# Patient Record
Sex: FEMALE | Race: WHITE | NOT HISPANIC OR LATINO | Employment: OTHER | URBAN - METROPOLITAN AREA
[De-identification: names, ages, dates, MRNs, and addresses within clinical notes are randomized per-mention and may not be internally consistent; named-entity substitution may affect disease eponyms.]

---

## 2017-01-09 DIAGNOSIS — E03.8 OTHER SPECIFIED HYPOTHYROIDISM: ICD-10-CM

## 2017-01-09 RX ORDER — LOSARTAN POTASSIUM 50 MG/1
TABLET ORAL
COMMUNITY
Start: 2014-01-15 | End: 2017-01-09 | Stop reason: SDUPTHER

## 2017-01-10 ENCOUNTER — TELEPHONE (OUTPATIENT)
Dept: MEDICAL GROUP | Facility: PHYSICIAN GROUP | Age: 67
End: 2017-01-10

## 2017-01-10 DIAGNOSIS — E78.1 PURE HYPERGLYCERIDEMIA: ICD-10-CM

## 2017-01-10 DIAGNOSIS — E03.8 OTHER SPECIFIED HYPOTHYROIDISM: ICD-10-CM

## 2017-01-10 DIAGNOSIS — E55.9 VITAMIN D DEFICIENCY DISEASE: ICD-10-CM

## 2017-01-10 DIAGNOSIS — I10 ESSENTIAL HYPERTENSION: ICD-10-CM

## 2017-01-10 DIAGNOSIS — D50.8 OTHER IRON DEFICIENCY ANEMIA: ICD-10-CM

## 2017-01-10 DIAGNOSIS — R73.01 IMPAIRED FASTING GLUCOSE: ICD-10-CM

## 2017-01-10 RX ORDER — LOSARTAN POTASSIUM 50 MG/1
50 TABLET ORAL DAILY
Qty: 90 TAB | Refills: 3 | Status: SHIPPED | OUTPATIENT
Start: 2017-01-10 | End: 2017-01-18 | Stop reason: SDUPTHER

## 2017-01-10 RX ORDER — LEVOTHYROXINE SODIUM 88 UG/1
TABLET ORAL
Qty: 90 TAB | Refills: 3 | Status: SHIPPED | OUTPATIENT
Start: 2017-01-10 | End: 2017-01-18 | Stop reason: SDUPTHER

## 2017-01-10 NOTE — TELEPHONE ENCOUNTER
Patient is waiting for referral to go through for internal medicine. The referral is pending, waiting for you to change the codes. Can you refill her meds in the meantime? Please advise.

## 2017-01-10 NOTE — TELEPHONE ENCOUNTER
Ok the referral has been resubmitted.  This is a very highly unusual referral request.  Typically you cannot have a referral to see another PCP from your existing PCP.  I am happy to see if it works though.

## 2017-01-10 NOTE — TELEPHONE ENCOUNTER
Did you receive note from referral department regarding referral? The code is wrong or something on the internal med referral. Please advise.

## 2017-01-18 RX ORDER — LEVOTHYROXINE SODIUM 88 UG/1
TABLET ORAL
Qty: 90 TAB | Refills: 3 | Status: SHIPPED | OUTPATIENT
Start: 2017-01-18 | End: 2017-12-04

## 2017-01-18 RX ORDER — LOSARTAN POTASSIUM 50 MG/1
50 TABLET ORAL DAILY
Qty: 90 TAB | Refills: 3 | Status: SHIPPED | OUTPATIENT
Start: 2017-01-18

## 2017-01-18 RX ORDER — SIMVASTATIN 40 MG
TABLET ORAL
Qty: 30 TAB | Refills: 0 | Status: SHIPPED | OUTPATIENT
Start: 2017-01-18 | End: 2017-02-02 | Stop reason: SDUPTHER

## 2017-01-18 RX ORDER — SIMVASTATIN 40 MG
TABLET ORAL
COMMUNITY
Start: 2014-01-15 | End: 2017-01-18 | Stop reason: SDUPTHER

## 2017-01-30 ENCOUNTER — TELEPHONE (OUTPATIENT)
Dept: MEDICAL GROUP | Facility: PHYSICIAN GROUP | Age: 67
End: 2017-01-30

## 2017-01-30 NOTE — TELEPHONE ENCOUNTER
----- Message from Shannon Sorto sent at 1/29/2017  4:27 PM PST -----  Regarding: FW: Referral Request  Contact: [phone]      ----- Message -----     From: Christen Dennis     Sent: 1/28/2017   3:21 PM       To: Amb All Mas  Subject: Referral Request                                 Christen Dennis would like to request a referral.  Reason: Severe hair loss  Requested provider: Dr. Bynum [address] [place] [address]  Comment:  I received a referral to Elena CAIN at [address] [place] [address] a dermatologist. When I called for an appointment at the clinic they had me see Dr. Bynum as he had openings, they both work at the same facility both are dermatologists. It looks like [facility] is not recognizing that and is charging me a non referral fee. How do I fix this? I have an appointment on Thursday again with the Dermatologist. I still do not have hair but there is some growing with the treatments from the dermatologist.Christen

## 2017-02-02 RX ORDER — SIMVASTATIN 40 MG
TABLET ORAL
Qty: 90 TAB | Refills: 0 | Status: SHIPPED | OUTPATIENT
Start: 2017-02-02

## 2017-02-03 ENCOUNTER — PATIENT MESSAGE (OUTPATIENT)
Dept: MEDICAL GROUP | Facility: PHYSICIAN GROUP | Age: 67
End: 2017-02-03

## 2017-02-03 DIAGNOSIS — L65.9 ALOPECIA: ICD-10-CM

## 2017-02-05 DIAGNOSIS — I10 ESSENTIAL HYPERTENSION: ICD-10-CM

## 2017-02-05 DIAGNOSIS — E03.8 OTHER SPECIFIED HYPOTHYROIDISM: ICD-10-CM

## 2017-02-05 DIAGNOSIS — E78.1 PURE HYPERGLYCERIDEMIA: ICD-10-CM

## 2017-02-06 RX ORDER — METOPROLOL SUCCINATE 25 MG
TABLET, EXTENDED RELEASE 24 HR ORAL
Qty: 90 TAB | Refills: 0 | Status: SHIPPED | OUTPATIENT
Start: 2017-02-06 | End: 2017-05-06 | Stop reason: SDUPTHER

## 2017-02-06 RX ORDER — HYDROCHLOROTHIAZIDE 25 MG/1
TABLET ORAL
Qty: 90 TAB | Refills: 0 | Status: SHIPPED | OUTPATIENT
Start: 2017-02-06 | End: 2017-05-06 | Stop reason: SDUPTHER

## 2017-02-06 NOTE — TELEPHONE ENCOUNTER
3 month supply refilled. Please advise pt to do fasting labs and make appt for follow-up and additional fills.

## 2017-02-14 ENCOUNTER — HOSPITAL ENCOUNTER (OUTPATIENT)
Dept: LAB | Facility: MEDICAL CENTER | Age: 67
End: 2017-02-14
Attending: FAMILY MEDICINE
Payer: OTHER GOVERNMENT

## 2017-02-14 ENCOUNTER — TELEPHONE (OUTPATIENT)
Dept: MEDICAL GROUP | Facility: PHYSICIAN GROUP | Age: 67
End: 2017-02-14

## 2017-02-14 DIAGNOSIS — I10 ESSENTIAL HYPERTENSION: ICD-10-CM

## 2017-02-14 DIAGNOSIS — E78.1 PURE HYPERGLYCERIDEMIA: ICD-10-CM

## 2017-02-14 DIAGNOSIS — E03.8 OTHER SPECIFIED HYPOTHYROIDISM: ICD-10-CM

## 2017-02-14 LAB
ALBUMIN SERPL BCP-MCNC: 4 G/DL (ref 3.2–4.9)
ALBUMIN/GLOB SERPL: 1.4 G/DL
ALP SERPL-CCNC: 66 U/L (ref 30–99)
ALT SERPL-CCNC: 33 U/L (ref 2–50)
ANION GAP SERPL CALC-SCNC: 8 MMOL/L (ref 0–11.9)
AST SERPL-CCNC: 23 U/L (ref 12–45)
BILIRUB SERPL-MCNC: 0.9 MG/DL (ref 0.1–1.5)
BUN SERPL-MCNC: 18 MG/DL (ref 8–22)
CALCIUM SERPL-MCNC: 9.6 MG/DL (ref 8.4–10.2)
CHLORIDE SERPL-SCNC: 103 MMOL/L (ref 96–112)
CHOLEST SERPL-MCNC: 162 MG/DL (ref 100–199)
CO2 SERPL-SCNC: 29 MMOL/L (ref 20–33)
CREAT SERPL-MCNC: 0.97 MG/DL (ref 0.5–1.4)
GFR SERPL CREATININE-BSD FRML MDRD: 58 ML/MIN/1.73 M 2
GLOBULIN SER CALC-MCNC: 2.8 G/DL (ref 1.9–3.5)
GLUCOSE SERPL-MCNC: 96 MG/DL (ref 65–99)
HDLC SERPL-MCNC: 49 MG/DL
LDLC SERPL CALC-MCNC: 88 MG/DL
POTASSIUM SERPL-SCNC: 3.5 MMOL/L (ref 3.6–5.5)
PROT SERPL-MCNC: 6.8 G/DL (ref 6–8.2)
SODIUM SERPL-SCNC: 140 MMOL/L (ref 135–145)
T4 FREE SERPL-MCNC: 1.76 NG/DL (ref 0.58–1.64)
TRIGL SERPL-MCNC: 127 MG/DL (ref 0–149)
TSH SERPL DL<=0.005 MIU/L-ACNC: 4.41 UIU/ML (ref 0.35–5.5)

## 2017-02-14 PROCEDURE — 80061 LIPID PANEL: CPT

## 2017-02-14 PROCEDURE — 80053 COMPREHEN METABOLIC PANEL: CPT

## 2017-02-14 PROCEDURE — 84439 ASSAY OF FREE THYROXINE: CPT

## 2017-02-14 PROCEDURE — 84443 ASSAY THYROID STIM HORMONE: CPT

## 2017-02-14 PROCEDURE — 36415 COLL VENOUS BLD VENIPUNCTURE: CPT

## 2017-02-14 NOTE — TELEPHONE ENCOUNTER
Patient notified and understood.  She stated she did not plan to follow up with endocrinology and will take a look at them online.

## 2017-02-14 NOTE — TELEPHONE ENCOUNTER
----- Message from Fara Oviedo M.D. sent at 2/14/2017  1:24 PM PST -----  Christen's thyroid labs are a bit off.  Does she have any plans to follow up with endocrinology in the near future?

## 2017-04-25 ENCOUNTER — TELEPHONE (OUTPATIENT)
Dept: MEDICAL GROUP | Facility: PHYSICIAN GROUP | Age: 67
End: 2017-04-25

## 2017-04-25 ENCOUNTER — PATIENT MESSAGE (OUTPATIENT)
Dept: FAMILY PLANNING/WOMEN'S HEALTH CLINIC | Facility: OTHER | Age: 67
End: 2017-04-25

## 2017-04-25 NOTE — TELEPHONE ENCOUNTER
Just received a bill from the Dermatologist that I have a referral for dated 2-6-2017 When I called [facility] they said Dr Oviedo has to do a revised referral to include the codes  and 1190, code #66690 is included in the referral. Can you also renew the referral I think one more visit is approved and I'm sure Dr. Bynum will want one more visit after my May 11th visit.Referral to [facility]   [facility]   [id number] [place], [place]   Christen

## 2017-05-08 RX ORDER — HYDROCHLOROTHIAZIDE 25 MG/1
25 TABLET ORAL DAILY
Qty: 90 TAB | Refills: 0 | Status: SHIPPED | OUTPATIENT
Start: 2017-05-08 | End: 2017-08-06 | Stop reason: SDUPTHER

## 2017-05-08 RX ORDER — METOPROLOL SUCCINATE 25 MG/1
25 TABLET, EXTENDED RELEASE ORAL DAILY
Qty: 90 TAB | Refills: 0 | Status: SHIPPED | OUTPATIENT
Start: 2017-05-08 | End: 2017-08-06 | Stop reason: SDUPTHER

## 2017-05-08 NOTE — TELEPHONE ENCOUNTER
Was the patient seen in the last year in this department? Yes     Does patient have an active prescription for medications requested? No     Received Request Via: Pharmacy      Pt met protocol?: No pt last ov 9/2016 was asked to make a f/u appt has yet to do so   BP Readings from Last 1 Encounters:   09/08/16 126/80

## 2017-08-07 RX ORDER — HYDROCHLOROTHIAZIDE 25 MG/1
TABLET ORAL
Qty: 90 TAB | Refills: 0 | Status: SHIPPED | OUTPATIENT
Start: 2017-08-07

## 2017-08-07 RX ORDER — METOPROLOL SUCCINATE 25 MG
TABLET, EXTENDED RELEASE 24 HR ORAL
Qty: 90 TAB | Refills: 0 | Status: SHIPPED | OUTPATIENT
Start: 2017-08-07

## 2017-09-27 ENCOUNTER — OFFICE VISIT (OUTPATIENT)
Dept: ENDOCRINOLOGY | Facility: MEDICAL CENTER | Age: 67
End: 2017-09-27
Payer: MEDICARE

## 2017-09-27 ENCOUNTER — HOSPITAL ENCOUNTER (OUTPATIENT)
Dept: LAB | Facility: MEDICAL CENTER | Age: 67
End: 2017-09-27
Attending: INTERNAL MEDICINE
Payer: MEDICARE

## 2017-09-27 VITALS
HEART RATE: 69 BPM | DIASTOLIC BLOOD PRESSURE: 80 MMHG | HEIGHT: 64 IN | BODY MASS INDEX: 30.56 KG/M2 | SYSTOLIC BLOOD PRESSURE: 128 MMHG | OXYGEN SATURATION: 100 % | WEIGHT: 179 LBS

## 2017-09-27 DIAGNOSIS — E03.9 HYPOTHYROIDISM, UNSPECIFIED TYPE: ICD-10-CM

## 2017-09-27 DIAGNOSIS — L63.0 ALOPECIA (CAPITIS) TOTALIS: ICD-10-CM

## 2017-09-27 LAB
T4 FREE SERPL-MCNC: 1.05 NG/DL (ref 0.53–1.43)
TSH SERPL DL<=0.005 MIU/L-ACNC: 4.39 UIU/ML (ref 0.3–3.7)

## 2017-09-27 PROCEDURE — 36415 COLL VENOUS BLD VENIPUNCTURE: CPT

## 2017-09-27 PROCEDURE — 84443 ASSAY THYROID STIM HORMONE: CPT

## 2017-09-27 PROCEDURE — 84439 ASSAY OF FREE THYROXINE: CPT

## 2017-09-27 PROCEDURE — 99213 OFFICE O/P EST LOW 20 MIN: CPT | Performed by: INTERNAL MEDICINE

## 2017-09-27 RX ORDER — HYDROCORTISONE ACETATE 0.5 %
CREAM (GRAM) TOPICAL
COMMUNITY

## 2017-09-28 NOTE — PROGRESS NOTES
Chief Complaint   Patient presents with   • Hypothyroidism        HPI:    Hypothyroidism           Unclear etiology. She was taking levothyroxine 100 µg per day last year. The dose was lowered to 88 µg per day last year I think in June when her TSH was 0.2. Her free T4 was 2.4 which was elevated and T3 hormone elevated at 342. She does not recall if she could've been taking biotin back that which will have falsely elevated her T4 and T3 but would not falsely lower her TSH. Follow-up study in August that year shows a TSH of 1.5 and free T4 upper normal 1.6 so that seems satisfactory.     However, in February of this year theTSH was 4.4 and at the same time her free T4 once again is elevated at 1.7. These last 2 tests are not internally consistent. Once again I wonder if biotin might be a factor. Without doing anything else I think we should repeat those tests now and see. She does not list biotin as one of her supplements.    Alopecia         The patient has been battling significant scalp hair loss going to a dermatologist who is doing scalp injections of steroids. I think she tired that and was losing the bowel and now she has elected to go total bald. She does not have excessive body hair or other signs of virilization    ROS:   Question of sleep apnea. I encouraged her to pursue this with her PCP  All other systems reported as negative or unchanged since last exam      Allergies: No Known Allergies    Current medicines including changes today:  Current Outpatient Prescriptions   Medication Sig Dispense Refill   • Fexofenadine HCl (ALLEGRA PO) Take  by mouth.     • Glucosamine-Chondroit-Vit C-Mn (GLUCOSAMINE 1500 COMPLEX) Cap Take  by mouth.     • TOPROL XL 25 MG TABLET SR 24 HR TAKE 1 TABLET DAILY 90 Tab 0   • hydrochlorothiazide (HYDRODIURIL) 25 MG Tab TAKE 1 TABLET DAILY 90 Tab 0   • simvastatin (ZOCOR) 40 MG Tab TAKE 1 TABLET EVERY EVENING 90 Tab 0   • levothyroxine (SYNTHROID) 88 MCG Tab TAKE 1 TABLET EVERY  "MORNING ON AN EMPTY STOMACH 90 Tab 3   • Multiple Vitamin (MULTIVITAMINS PO) Take  by mouth.     • docosahexanoic acid (OMEGA 3 FA) 1000 MG CAPS Take 1,000 mg by mouth 2 Times a Day.     • Calcium-Magnesium-Vitamin D (CALCIUM 500 PO) Take  by mouth.     • ferrous gluconate (FERGON) 324 (38 FE) MG TABS Take 324 mg by mouth every morning with breakfast.     • losartan (COZAAR) 50 MG Tab Take 1 Tab by mouth every day. TAKE 1 TABLET BY MOUTH DAILY 90 Tab 3   • Red Yeast Rice Extract (RED YEAST RICE PO) Take  by mouth.       No current facility-administered medications for this visit.         Past Medical History:   Diagnosis Date   • Alopecia (capitis) totalis    • Hyperlipidemia    • Hypertension    • Hypothyroidism        PHYSICAL EXAM:    /80   Pulse 69   Ht 1.626 m (5' 4\")   Wt 81.2 kg (179 lb)   SpO2 100%   BMI 30.73 kg/m²      Gen.   appears healthy     Skin   appropriate for sex and age           Almost no body hair, no facial hair and secondary sexual hair has diminished as well    HEENT  unremarkable    Neck   thyroid gland small and difficult to palpate.    Heart  regular    Extremities  no edema    Neuro  gait and station normal    Psych  appropriate    ASSESSMENT AND RECOMMENDATIONS    1. Hypothyroidism, unspecified type             Question of appropriate replacement  - FREE THYROXINE; Future  - TSH; Future    2. Alopecia (capitis) totalis               Not hormonally caused      DISPOSITION: Follow-up thyroid studies by phone and adjust.                            Return in 6 months       Cortez Echevarria M.D.    Copies to: Fara Oviedo M.D. [phone]  "

## 2017-09-30 DIAGNOSIS — E03.9 HYPOTHYROIDISM, UNSPECIFIED TYPE: Primary | ICD-10-CM

## 2017-11-28 ENCOUNTER — HOSPITAL ENCOUNTER (OUTPATIENT)
Dept: LAB | Facility: MEDICAL CENTER | Age: 67
End: 2017-11-28
Attending: INTERNAL MEDICINE
Payer: MEDICARE

## 2017-11-28 DIAGNOSIS — E03.9 HYPOTHYROIDISM, UNSPECIFIED TYPE: ICD-10-CM

## 2017-11-28 LAB
T4 FREE SERPL-MCNC: 1.15 NG/DL (ref 0.53–1.43)
TSH SERPL DL<=0.005 MIU/L-ACNC: 2.22 UIU/ML (ref 0.3–3.7)

## 2017-11-28 PROCEDURE — 84439 ASSAY OF FREE THYROXINE: CPT

## 2017-11-28 PROCEDURE — 84443 ASSAY THYROID STIM HORMONE: CPT

## 2017-11-28 PROCEDURE — 36415 COLL VENOUS BLD VENIPUNCTURE: CPT

## 2017-12-04 DIAGNOSIS — E03.9 HYPOTHYROIDISM, UNSPECIFIED TYPE: Primary | ICD-10-CM

## 2017-12-04 RX ORDER — LEVOTHYROXINE SODIUM 88 MCG
88 TABLET ORAL
Qty: 30 TAB | Refills: 6 | Status: SHIPPED | OUTPATIENT
Start: 2017-12-04 | End: 2018-04-25

## 2017-12-05 DIAGNOSIS — E03.9 HYPOTHYROIDISM, UNSPECIFIED TYPE: Primary | ICD-10-CM

## 2017-12-05 RX ORDER — LEVOTHYROXINE SODIUM 88 MCG
88 TABLET ORAL
Qty: 90 TAB | Refills: 3 | Status: SHIPPED | OUTPATIENT
Start: 2017-12-05 | End: 2018-04-25

## 2018-01-08 ENCOUNTER — TELEPHONE (OUTPATIENT)
Dept: ENDOCRINOLOGY | Facility: MEDICAL CENTER | Age: 68
End: 2018-01-08

## 2018-01-08 NOTE — TELEPHONE ENCOUNTER
Christen called in to see if you could put in another order for her to have her thyroid levels checked prior to her February 26th appointment.

## 2018-01-15 DIAGNOSIS — E03.9 HYPOTHYROIDISM, UNSPECIFIED TYPE: ICD-10-CM

## 2018-02-14 ENCOUNTER — HOSPITAL ENCOUNTER (OUTPATIENT)
Dept: LAB | Facility: MEDICAL CENTER | Age: 68
End: 2018-02-14
Attending: INTERNAL MEDICINE
Payer: MEDICARE

## 2018-02-14 DIAGNOSIS — E03.9 HYPOTHYROIDISM, UNSPECIFIED TYPE: ICD-10-CM

## 2018-02-14 LAB
T4 FREE SERPL-MCNC: 1.15 NG/DL (ref 0.53–1.43)
TSH SERPL DL<=0.005 MIU/L-ACNC: 4.41 UIU/ML (ref 0.38–5.33)

## 2018-02-14 PROCEDURE — 84443 ASSAY THYROID STIM HORMONE: CPT

## 2018-02-14 PROCEDURE — 84439 ASSAY OF FREE THYROXINE: CPT

## 2018-02-14 PROCEDURE — 36415 COLL VENOUS BLD VENIPUNCTURE: CPT

## 2018-03-05 ENCOUNTER — OFFICE VISIT (OUTPATIENT)
Dept: ENDOCRINOLOGY | Facility: MEDICAL CENTER | Age: 68
End: 2018-03-05
Payer: MEDICARE

## 2018-03-05 VITALS
OXYGEN SATURATION: 95 % | SYSTOLIC BLOOD PRESSURE: 136 MMHG | WEIGHT: 178 LBS | HEART RATE: 88 BPM | HEIGHT: 64 IN | BODY MASS INDEX: 30.39 KG/M2 | DIASTOLIC BLOOD PRESSURE: 70 MMHG

## 2018-03-05 DIAGNOSIS — E03.9 HYPOTHYROIDISM, UNSPECIFIED TYPE: ICD-10-CM

## 2018-03-05 PROCEDURE — 99213 OFFICE O/P EST LOW 20 MIN: CPT | Performed by: INTERNAL MEDICINE

## 2018-03-06 NOTE — PROGRESS NOTES
"Chief Complaint   Patient presents with   • Hypothyroidism        HPI:    See assessment and recommendations below    ROS:  All other systems reported as negative or unchanged since last exam      Allergies: No Known Allergies    Current medicines including changes today:  Current Outpatient Prescriptions   Medication Sig Dispense Refill   • SYNTHROID 88 MCG Tab Take 1 Tab by mouth Every morning on an empty stomach. 90 Tab 3   • Fexofenadine HCl (ALLEGRA PO) Take  by mouth.     • Glucosamine-Chondroit-Vit C-Mn (GLUCOSAMINE 1500 COMPLEX) Cap Take  by mouth.     • TOPROL XL 25 MG TABLET SR 24 HR TAKE 1 TABLET DAILY 90 Tab 0   • Multiple Vitamin (MULTIVITAMINS PO) Take  by mouth.     • docosahexanoic acid (OMEGA 3 FA) 1000 MG CAPS Take 1,000 mg by mouth 2 Times a Day.     • Calcium-Magnesium-Vitamin D (CALCIUM 500 PO) Take  by mouth.     • ferrous gluconate (FERGON) 324 (38 FE) MG TABS Take 324 mg by mouth every morning with breakfast.     • SYNTHROID 88 MCG Tab Take 1 Tab by mouth Every morning on an empty stomach. 30 Tab 6   • hydrochlorothiazide (HYDRODIURIL) 25 MG Tab TAKE 1 TABLET DAILY 90 Tab 0   • simvastatin (ZOCOR) 40 MG Tab TAKE 1 TABLET EVERY EVENING 90 Tab 0   • losartan (COZAAR) 50 MG Tab Take 1 Tab by mouth every day. TAKE 1 TABLET BY MOUTH DAILY 90 Tab 3   • Red Yeast Rice Extract (RED YEAST RICE PO) Take  by mouth.       No current facility-administered medications for this visit.         Past Medical History:   Diagnosis Date   • Alopecia (capitis) totalis    • Hyperlipidemia    • Hypertension    • Hypothyroidism        PHYSICAL EXAM:    /70   Pulse 88   Ht 1.626 m (5' 4\")   Wt 80.7 kg (178 lb)   SpO2 95%   BMI 30.55 kg/m²      Gen.   appears healthy     Skin   appropriate for sex and age    HEENT  unremarkable    Neck   thyroid gland is difficult to palpate. I don't think it's enlarged. It might be partly substernal    Heart  regular    Extremities  no edema    Neuro  gait and station " normal    Psych  appropriate      ASSESSMENT AND RECOMMENDATIONS    1. Hypothyroidism, unspecified type               Question of appropriate replacement               Current TSH is 4.4 and free thyroxine is mid range at 1.1.               She is currently taking Levoxyl 88 µg every day and one day a week and extra one half tablet. She would like to move her dose up to levothyroxine 100 µg daily               Repeat thyroid levels in about 6 weeks which is all the 100 µg thyroxine she has to use and will need a refill thereafter. She would like to see a blood level before the refill  - FREE THYROXINE; Future  - TSH; Future      DISPOSITION: Follow up will depend on degree of difficulty establishing a new thyroid dose       Cortez Echevarria M.D.    Copies to: Pcp Not In Computer None

## 2018-04-25 DIAGNOSIS — E03.9 HYPOTHYROIDISM, UNSPECIFIED TYPE: Primary | ICD-10-CM

## 2018-04-25 RX ORDER — LEVOTHYROXINE SODIUM 112 UG/1
112 TABLET ORAL
Qty: 30 TAB | Refills: 5 | Status: SHIPPED | OUTPATIENT
Start: 2018-04-25 | End: 2018-08-08 | Stop reason: SDUPTHER

## 2018-07-06 ENCOUNTER — HOSPITAL ENCOUNTER (OUTPATIENT)
Dept: LAB | Facility: MEDICAL CENTER | Age: 68
End: 2018-07-06
Attending: INTERNAL MEDICINE
Payer: MEDICARE

## 2018-07-06 DIAGNOSIS — E03.9 HYPOTHYROIDISM, UNSPECIFIED TYPE: ICD-10-CM

## 2018-07-06 LAB
T4 FREE SERPL-MCNC: 1.4 NG/DL (ref 0.53–1.43)
TSH SERPL DL<=0.005 MIU/L-ACNC: 0.25 UIU/ML (ref 0.38–5.33)

## 2018-07-06 PROCEDURE — 36415 COLL VENOUS BLD VENIPUNCTURE: CPT

## 2018-07-06 PROCEDURE — 84443 ASSAY THYROID STIM HORMONE: CPT

## 2018-07-06 PROCEDURE — 84439 ASSAY OF FREE THYROXINE: CPT

## 2018-07-12 DIAGNOSIS — E03.9 HYPOTHYROIDISM, UNSPECIFIED TYPE: Primary | ICD-10-CM

## 2018-08-08 DIAGNOSIS — E03.9 HYPOTHYROIDISM, UNSPECIFIED TYPE: ICD-10-CM

## 2018-08-08 RX ORDER — LEVOTHYROXINE SODIUM 112 UG/1
112 TABLET ORAL
Qty: 90 TAB | Refills: 2 | Status: SHIPPED | OUTPATIENT
Start: 2018-08-08